# Patient Record
Sex: MALE | Race: WHITE | Employment: OTHER | RURAL
[De-identification: names, ages, dates, MRNs, and addresses within clinical notes are randomized per-mention and may not be internally consistent; named-entity substitution may affect disease eponyms.]

---

## 2019-09-05 ENCOUNTER — OFFICE VISIT CONVERTED (OUTPATIENT)
Dept: CARDIOLOGY | Facility: CLINIC | Age: 79
End: 2019-09-05
Attending: SPECIALIST

## 2019-09-05 ENCOUNTER — CONVERSION ENCOUNTER (OUTPATIENT)
Dept: OTHER | Facility: HOSPITAL | Age: 79
End: 2019-09-05

## 2021-05-15 VITALS
WEIGHT: 187.25 LBS | BODY MASS INDEX: 25.36 KG/M2 | HEIGHT: 72 IN | SYSTOLIC BLOOD PRESSURE: 136 MMHG | HEART RATE: 62 BPM | DIASTOLIC BLOOD PRESSURE: 82 MMHG

## 2022-06-23 ENCOUNTER — OFFICE VISIT (OUTPATIENT)
Dept: ORTHOPEDIC SURGERY | Facility: CLINIC | Age: 82
End: 2022-06-23

## 2022-06-23 VITALS — WEIGHT: 185 LBS | OXYGEN SATURATION: 96 % | HEIGHT: 72 IN | HEART RATE: 71 BPM | BODY MASS INDEX: 25.06 KG/M2

## 2022-06-23 DIAGNOSIS — S86.012A RUPTURE OF LEFT ACHILLES TENDON, INITIAL ENCOUNTER: Primary | ICD-10-CM

## 2022-06-23 PROCEDURE — 99203 OFFICE O/P NEW LOW 30 MIN: CPT | Performed by: ORTHOPAEDIC SURGERY

## 2022-06-23 RX ORDER — SILDENAFIL 100 MG/1
TABLET, FILM COATED ORAL
COMMUNITY
Start: 2022-05-17

## 2022-06-23 RX ORDER — BLOOD SUGAR DIAGNOSTIC
STRIP MISCELLANEOUS
COMMUNITY
Start: 2022-05-28

## 2022-06-23 RX ORDER — LANCETS 33 GAUGE
EACH MISCELLANEOUS
COMMUNITY
Start: 2022-04-20

## 2022-06-23 RX ORDER — METFORMIN HYDROCHLORIDE 500 MG/1
TABLET, EXTENDED RELEASE ORAL
COMMUNITY
Start: 2022-05-29

## 2022-06-23 NOTE — PROGRESS NOTES
"Chief Complaint  Initial Evaluation of the Left Ankle     Subjective      Willy Moore presents to [facility] for evaluation of the left ankle. The patient states he tripped and fell causing an injury to his left ankle last week. He is wearing a tall CAM walker boot today. He has no other complaints.     No Known Allergies     Social History     Socioeconomic History   • Marital status: Unknown   Tobacco Use   • Smoking status: Former Smoker   • Smokeless tobacco: Never Used   Vaping Use   • Vaping Use: Never used        Review of Systems     Objective   Vital Signs:   Pulse 71   Ht 182.9 cm (72\")   Wt 83.9 kg (185 lb)   SpO2 96%   BMI 25.09 kg/m²       Physical Exam  Constitutional:       Appearance: Normal appearance. The patient is well-developed and normal weight.   HENT:      Head: Normocephalic.      Right Ear: Hearing and external ear normal.      Left Ear: Hearing and external ear normal.      Nose: Nose normal.   Eyes:      Conjunctiva/sclera: Conjunctivae normal.   Cardiovascular:      Rate and Rhythm: Normal rate.   Pulmonary:      Effort: Pulmonary effort is normal.      Breath sounds: No wheezing or rales.   Abdominal:      Palpations: Abdomen is soft.      Tenderness: There is no abdominal tenderness.   Musculoskeletal:      Cervical back: Normal range of motion.   Skin:     Findings: No rash.   Neurological:      Mental Status: The patient is alert and oriented to person, place, and time.   Psychiatric:         Mood and Affect: Mood and affect normal.         Judgment: Judgment normal.       Ortho Exam      Left ankle- positive orellana testing. Neurovascularly intact. Positive EHL, FHL, GS and TA. Sensation intact to all 5 nerves of the foot. Positive pulses. Can move toes. Good capillary refill. Tender to the achilles tendon. palpable gap to the achilles insertion. Intact ankle plantar flexion and dorsiflexion     Procedures      Imaging Results (Most Recent)     " None           Result Review :       No results found.           Assessment and Plan     Diagnoses and all orders for this visit:    1. Rupture of left Achilles tendon, initial encounter (Primary)        Discussed the treatment options with the patient, operative vs non-operative. Discussed the risks and benefits of a left achilles tendon repair vs conservative treatment in a CAM walker boot. The patient expressed understanding. Plan for MRI of the left ankle to help decide for operative treatment vs conservative. A wedge was placed into the boot today.     Call or return if worsening symptoms.    Follow Up     After MRI      Patient was given instructions and counseling regarding his condition or for health maintenance advice. Please see specific information pulled into the AVS if appropriate.     Scribed for Nuno Singh MD by Koki Jin.  06/23/22   14:25 EDT    I have personally performed the services described in this document as scribed by the above individual and it is both accurate and complete. Nuno Singh MD 06/24/22

## 2022-07-01 ENCOUNTER — TELEPHONE (OUTPATIENT)
Dept: ORTHOPEDIC SURGERY | Facility: CLINIC | Age: 82
End: 2022-07-01